# Patient Record
Sex: FEMALE | Race: WHITE | Employment: FULL TIME | URBAN - METROPOLITAN AREA
[De-identification: names, ages, dates, MRNs, and addresses within clinical notes are randomized per-mention and may not be internally consistent; named-entity substitution may affect disease eponyms.]

---

## 2021-06-23 ENCOUNTER — APPOINTMENT (OUTPATIENT)
Dept: GENERAL RADIOLOGY | Age: 26
End: 2021-06-23
Payer: COMMERCIAL

## 2021-06-23 ENCOUNTER — HOSPITAL ENCOUNTER (EMERGENCY)
Age: 26
Discharge: HOME | End: 2021-06-23
Payer: COMMERCIAL

## 2021-06-23 VITALS
HEIGHT: 61 IN | BODY MASS INDEX: 35.87 KG/M2 | TEMPERATURE: 97.7 F | RESPIRATION RATE: 19 BRPM | WEIGHT: 190 LBS | SYSTOLIC BLOOD PRESSURE: 119 MMHG | OXYGEN SATURATION: 98 % | HEART RATE: 87 BPM | DIASTOLIC BLOOD PRESSURE: 89 MMHG

## 2021-06-23 DIAGNOSIS — S60.221A CONTUSION OF RIGHT HAND, INITIAL ENCOUNTER: Primary | ICD-10-CM

## 2021-06-23 DIAGNOSIS — M79.641 RIGHT HAND PAIN: ICD-10-CM

## 2021-06-23 PROCEDURE — 73130 X-RAY EXAM OF HAND: CPT

## 2021-06-23 PROCEDURE — 6370000000 HC RX 637 (ALT 250 FOR IP): Performed by: PHYSICIAN ASSISTANT

## 2021-06-23 PROCEDURE — 99284 EMERGENCY DEPT VISIT MOD MDM: CPT

## 2021-06-23 RX ORDER — ACETAMINOPHEN 500 MG
1000 TABLET ORAL ONCE
Status: COMPLETED | OUTPATIENT
Start: 2021-06-23 | End: 2021-06-23

## 2021-06-23 RX ADMIN — ACETAMINOPHEN 1000 MG: 500 TABLET ORAL at 19:09

## 2021-06-23 ASSESSMENT — PAIN SCALES - GENERAL
PAINLEVEL_OUTOF10: 4
PAINLEVEL_OUTOF10: 5
PAINLEVEL_OUTOF10: 5

## 2021-06-24 NOTE — ED PROVIDER NOTES
[facility] Emergency Department    CHIEF COMPLAINT  Hand Injury (right hand pain, punched a wall)      SHARED SERVICE VISIT  Evaluated by ARACELY. My supervising physician was available for consultation. HISTORY OF PRESENT ILLNESS  Gia Ayala is a 32 y.o. female who presents to the ED complaining of injury to the right hand. Patient states that the previous night she punched a wall with her right hand. Since then she has noticed increased pain and bruising on the back of her right hand. Denies any other injury. Denies intoxication. Denies prior injury to that hand in the past.  No other complaints, modifying factors or associated symptoms. Nursing notes reviewed. Past Medical History:   Diagnosis Date    ADD (attention deficit disorder)     Asthma     Seasonal allergies      Past Surgical History:   Procedure Laterality Date    ADENOIDECTOMY      TYMPANOSTOMY TUBE PLACEMENT       History reviewed. No pertinent family history. Social History     Socioeconomic History    Marital status: Single     Spouse name: Not on file    Number of children: Not on file    Years of education: Not on file    Highest education level: Not on file   Occupational History    Not on file   Tobacco Use    Smoking status: Current Every Day Smoker     Packs/day: 0.50     Types: Cigarettes   Substance and Sexual Activity    Alcohol use: No    Drug use: No    Sexual activity: Not on file   Other Topics Concern    Not on file   Social History Narrative    Not on file     Social Determinants of Health     Financial Resource Strain:     Difficulty of Paying Living Expenses:    Food Insecurity:     Worried About Running Out of Food in the Last Year:     [address] in the Last Year:    Transportation Needs:     Lack of Transportation (Medical):      Lack of Transportation (Non-Medical):    Physical Activity:     Days of Exercise per Week:     Minutes of Exercise per Session: than 2 seconds. Compartments are soft. No peripheral edema. Moves all extremities equally. SKIN: Warm and dry. No acute rashes. Contusion to the dorsal aspect of the right hand. Mild abrasions to the third and fourth metacarpal phalangeal joint. NEUROLOGICAL: Alert and oriented. No gross facial drooping. PSYCHIATRIC: Normal mood and affect. RADIOLOGY  XR HAND RIGHT (MIN 3 VIEWS)   Final Result   No acute osseous abnormality. LABS  Labs Reviewed - No data to display    PROCEDURES  Unless otherwise noted below, none  Procedures    ED COURSE  Patient received Tylenol for pain, with good relief. Triage vitals within normal limits. A discussion was had with patient regarding following up with hand surgery as well as repeat imaging in 10 to 10 days. Risk management discussed and shared decision making had with patient and/or surrogate. All questions were answered. Patient will follow up with primary care or hand surgery for further evaluation/treatment. All questions answered. Patient will return to ED for new/worsening symptoms. CRITICAL CARE TIME  0 Minutes of critical care time spent not including separately billable procedures. MDM  68-year-old female presents emergency department for evaluation of a contusion to the right hand after punching a wall the night before. Patient reported increased swelling and contusions to the dorsal aspect the right hand. On exam she had swelling over the dorsal aspect of the right hand as well as a contusion. There is no snuffbox tenderness. Imaging was obtained which demonstrated no acute abnormality. Recommend the patient continue to ice the affected area and alternate between Tylenol ibuprofen for pain. Given the injury to the hand I recommend that she get repeat imaging in 7 to 10 days if she continues to have pain. Given RICE precautions. Patient was advised to follow-up with their primary care provider to be seen within 48 hours. Patient was advised on when to return to the emergency department and able to verbalize understanding of this plan. Was given the opportunity to ask questions and all questions were answered. Was provided with educational material regarding the diagnosis and plan for disposition. Patient was discharged home in a stable condition. Recommended she return to range of motion as tolerated. DISPOSITION  Patient was discharged to home in good condition. CLINICAL IMPRESSION  1. Contusion of right hand, initial encounter    2.  Right hand pain                    Brynn Gatica PA-C  06/24/21 9834

## 2021-07-02 ENCOUNTER — TELEPHONE (OUTPATIENT)
Dept: ORTHOPEDIC SURGERY | Age: 26
End: 2021-07-02

## 2022-05-07 ENCOUNTER — HOSPITAL ENCOUNTER (EMERGENCY)
Age: 27
Discharge: HOME | End: 2022-05-07
Attending: STUDENT IN AN ORGANIZED HEALTH CARE EDUCATION/TRAINING PROGRAM
Payer: COMMERCIAL

## 2022-05-07 VITALS
HEIGHT: 61 IN | BODY MASS INDEX: 33.04 KG/M2 | HEART RATE: 54 BPM | RESPIRATION RATE: 16 BRPM | TEMPERATURE: 98.1 F | SYSTOLIC BLOOD PRESSURE: 112 MMHG | WEIGHT: 175 LBS | DIASTOLIC BLOOD PRESSURE: 67 MMHG | OXYGEN SATURATION: 99 %

## 2022-05-07 DIAGNOSIS — T81.31XA DEHISCENCE OF OPERATIVE WOUND, INITIAL ENCOUNTER: Primary | ICD-10-CM

## 2022-05-07 PROCEDURE — 12001 RPR S/N/AX/GEN/TRNK 2.5CM/<: CPT

## 2022-05-07 PROCEDURE — 99282 EMERGENCY DEPT VISIT SF MDM: CPT

## 2022-05-07 ASSESSMENT — PAIN SCALES - GENERAL: PAINLEVEL_OUTOF10: 4

## 2022-05-07 NOTE — ED NOTES
Call placed to [facility] @ [id number]  Re: Mary Kay Tong- post operative wound dehisence per Dr. Katelyn Peterson @ [address] [facility]  05/07/22 4816

## 2022-05-09 NOTE — ED PROVIDER NOTES
[address] [facility]  ED      CHIEF COMPLAINT  Wound dehiscence    HISTORY OF PRESENT ILLNESS  Marylou Worthington is a 32 y.o. female with a past medical history of asthma, ADD, and tubal ligation, who presents to the ED complaining of laceration. Marylou Worthington presents with a postoperative wound dehiscence from laparoscopic tubal ligation within the past 24 hours, that occurred overnight, exact time unknown, while in bed. Patient reports that surgeon had placed observable sutures in this area. The pain is none, but patient did have trouble obtaining hemostasis. She does report some mild postoperative pain not associated with the wound dehiscence. She reports it is mild, aching, unchanged from her discharge post op. Other injuries, abrasions and lacerations are Absent. Fever and chills are absent. Old records reviewed: Patient is status post laparoscopic tubal ligation on 5/6. No other complaints, modifying factors or associated symptoms. I have reviewed the following from the nursing documentation. Past Medical History:   Diagnosis Date    ADD (attention deficit disorder)     Asthma     Seasonal allergies      Past Surgical History:   Procedure Laterality Date    ADENOIDECTOMY      TYMPANOSTOMY TUBE PLACEMENT       No family history on file.   Social History     Socioeconomic History    Marital status:      Spouse name: Not on file    Number of children: Not on file    Years of education: Not on file    Highest education level: Not on file   Occupational History    Not on file   Tobacco Use    Smoking status: Current Every Day Smoker     Packs/day: 0.50     Types: Cigarettes    Smokeless tobacco: Never Used   Substance and Sexual Activity    Alcohol use: No    Drug use: No    Sexual activity: Not on file   Other Topics Concern    Not on file   Social History Narrative    Not on file     Social Determinants of Health     Financial Resource Strain:     Difficulty of Paying Living Expenses: Not on file   Food Insecurity:     Worried About Running Out of Food in the Last Year: Not on file    Ran Out of Food in the Last Year: Not on file   Transportation Needs:     Lack of Transportation (Medical): Not on file    Lack of Transportation (Non-Medical): Not on file   Physical Activity:     Days of Exercise per Week: Not on file    Minutes of Exercise per Session: Not on file   Stress:     Feeling of Stress : Not on file   Social Connections:     Frequency of Communication with Friends and Family: Not on file    Frequency of Social Gatherings with Friends and Family: Not on file    Attends Mu-ism Services: Not on file    Active Member of [address] [place] [address] Bloc or Organizations: Not on file    Attends Club or Organization Meetings: Not on file    Marital Status: Not on file   Intimate Partner Violence:     Fear of Current or Ex-Partner: Not on file    Emotionally Abused: Not on file    Physically Abused: Not on file    Sexually Abused: Not on file   Housing Stability:     Unable to Pay for Housing in the Last Year: Not on file    Number of [place] in the Last Year: Not on file    Unstable Housing in the Last Year: Not on file     No current facility-administered medications for this encounter. Current Outpatient Medications   Medication Sig Dispense Refill    montelukast (SINGULAIR) 10 MG tablet Take 10 mg by mouth nightly      fluticasone (FLONASE) 50 MCG/ACT nasal spray 2 sprays by Nasal route daily 1 Bottle 0    acyclovir (ZOVIRAX) 400 MG tablet Take 400 mg by mouth 2 times daily.  cetirizine (ZYRTEC) 10 MG tablet Take 10 mg by mouth daily.  drospirenone-ethinyl estradiol (MILY) 3-0.02 MG per tablet Take 1 tablet by mouth daily.  amphetamine-dextroamphetamine (ADDERALL XR) 20 MG XR capsule Take 20 mg by mouth every morning.        Allergies   Allergen Reactions    Seasonal        REVIEW OF SYSTEMS  All systems reviewed, pertinent positives per HPI otherwise noted to be negative. PHYSICAL EXAM  /67   Pulse 54   Temp 98.1 °F (36.7 °C) (Oral)   Resp 16   Ht 5' 1\" (1.549 m)   Wt 175 lb (79.4 kg)   SpO2 99%   BMI 33.07 kg/m²    GENERAL APPEARANCE: Awake and alert. Cooperative. no distress. HENT: Normocephalic. Atraumatic. Mucous membranes are moist  NECK: Supple. Full range of motion of the neck without stiffness or pain. EYES: PERRL. EOM's grossly intact. HEART/CHEST: RRR. No murmurs. Chest wall is not tender to palpation. LUNGS: Respirations unlabored. CTAB. Good air exchange. Speaking comfortably in full sentences. ABDOMEN: minimal suprapubic tenderness. Soft. Non-distended. No masses. No organomegaly. No guarding or rebound. MUSCULOSKELETAL: No extremity edema. Compartments soft. No deformity. No tenderness in the extremities. All extremities neurovascularly intact. SKIN: Warm and dry. No acute rashes. The abdomen laceration is 1mm and located inside the umbilicus. no tenderness to palpation. Surrounding erythema is absent. Drainage is absent. Cyanosis, edema, pallor, petechiae, rashes are absent. Patient does have mild postoperative ecchymoses. skin temperature is normal.  Foreign bodies are absent. NEUROLOGICAL: Alert and oriented. CN's 2-12 intact. No gross facial drooping. Strength 5/5, sensation intact. PSYCHIATRIC: Normal mood and affect. LABS  I have reviewed all labs for this visit. No results found for this visit on 05/07/22. RADIOLOGY  No orders to display            PROCEDURE: LACERATION REPAIR  Jorja Babinski or their surrogate had an opportunity to ask questions, and the risks, benefits, and alternatives were discussed. The wound was prepped and draped to maintain a sterile field. A local anesthetic was used to completely anesthetize the wound. It was copiously irrigated. It was explored to its depth in a bloodless field with no sign of tendon, nerve, or vascular injury.  No foreign bodies were identified. It was closed with dermabond. There were no complications during the procedure. ED COURSE / MDM  Patient seen and evaluated. Old records reviewed. Labs and imaging reviewed and results discussed with patient. Overall well appearing patient, in no acute distress, presenting for laceration due to surgical wound dehiscence. Physical exam remarkable for 1mm wound to umbilicus that is current hemostatic. Differential diagnosis includes but is not limited to: laceration, low suspicion for foreign body, neurovascular injury, tendon injury    During the patient's ED course, the patient was given:  Medications - No data to display     Patient is within 24 hours post op of a laparoscopic tubal ligation, presenting with umbilicus wound dehiscence. Evaluation shows 1 mm wound of the umbilicus, currently hemostatic. I did consult patient's surgeon Dr Jacqueline Wynn to confirm wound could be closed without other intervention/workup. Dr Jacqueline Wynn that that wound could be closed, adhesive Dermabond would be appropriate. He said he is wounds were overall low risk for infection despite being a laparoscopic ointment wounds. Given benign abdominal exam, no evidence of infection of the wound, he did not have any further work-up or imaging. Wound was repaired with Dermabond. Evaluation overall reassuring. At this time, feel the patient is appropriate for discharge to follow-up with a primary care doctor. Patient feels comfortable with discharge at this time. Return precautions given. Encouraged PCP follow-up in 3 days and surgical follow-up as soon as possible. Patient discharged in stable condition. I estimate there is LOW risk for COMPARTMENT SYNDROME, TENDON OR NEUROVASCULAR INJURY, OR FOREIGN BODY, thus I consider the discharge disposition reasonable. Also, there is no evidence or peritonitis, sepsis, or toxicity.  Curry Payan and I have discussed the diagnosis and risks, and we agree with discharging home to follow-up with their primary doctor. We also discussed returning to the Emergency Department immediately if new or worsening symptoms occur. We have discussed the symptoms which are most concerning (e.g., changing or worsening pain, fever, numbness, weakness, cool or painful digits) that necessitate immediate return. CLINICAL IMPRESSION  1. Dehiscence of operative wound, initial encounter        Blood pressure 112/67, pulse 54, temperature 98.1 °F (36.7 °C), temperature source Oral, resp. rate 16, height 5' 1\" (1.549 m), weight 175 lb (79.4 kg), SpO2 99 %. Barbi Mcknight was discharged to home in stable condition. Patient was given scripts for the following medications. I counseled patient how to take these medications. Discharge Medication List as of 5/7/2022  4:58 AM          Follow-up with:  Angela Askew MD  [address]  [phone]    Schedule an appointment as soon as possible for a visit in 2 days  For wound re-check    Greg Reddy  [address] [address] [facility] [address] [phone]    Go to   As needed    [facility]  ED  [address]  Go to   As needed, If symptoms worsen      DISCLAIMER: This chart was created using Dragon dictation software. Efforts were made by me to ensure accuracy, however some errors may be present due to limitations of this technology and occasionally words are not transcribed correctly.         Tao Felix MD  05/09/22 0717